# Patient Record
(demographics unavailable — no encounter records)

---

## 2025-04-02 NOTE — RISK ASSESSMENT
[0] : 2) Feeling down, depressed, or hopeless: Not at all (0) [PHQ-2 Negative - No further assessment needed] : PHQ-2 Negative - No further assessment needed [Yes] : Patient consents to screening. [Have you ever fainted, passed out or had an unexplained seizure suddenly and without warning, especially during exercise or in response] : Have you ever fainted, passed out or had an unexplained seizure suddenly and without warning, especially during exercise or in response to sudden loud noises such as doorbells, alarm clocks and ringing telephones? No [Have you ever had exercise-related chest pain or shortness of breath?] : Have you ever had exercise-related chest pain or shortness of breath? No [Has anyone in your immediate family (parents, grandparents, siblings) or other more distant relatives (aunts, uncles, cousins)  of heart] : Has anyone in your immediate family (parents, grandparents, siblings) or other more distant relatives (aunts, uncles, cousins)  of heart problems or had an unexpected sudden death before age 50 (This would include unexpected drownings, unexplained car accidents in which the relative was driving or sudden infant death syndrome.)? No [Are you related to anyone with hypertrophic cardiomyopathy or hypertrophic obstructive cardiomyopathy, Marfan syndrome, arrhythmogenic] : Are you related to anyone with hypertrophic cardiomyopathy or hypertrophic obstructive cardiomyopathy, Marfan syndrome, arrhythmogenic right ventricular cardiomyopathy, long QT syndrome, short QT syndrome, Brugada syndrome or catecholaminergic polymorphic ventricular tachycardia, or anyone younger than 50 years with a pacemaker or implantable defibrillator? No

## 2025-04-02 NOTE — REVIEW OF SYSTEMS
[Difficulty with Sleep] : difficulty with sleep [Snoring] : snoring [Negative] : Gastrointestinal [Fever] : no fever [Chills] : no chills [Malaise] : no malaise [Change in Weight] : no change in weight [Night Sweats] : no night sweats [Headache] : no headache [Eye Discharge] : no eye discharge [Eye Redness] : no eye redness [Itchy Eyes] : no itchy eyes [Changes in Vision] : no changes in vision [Ear Pain] : no ear pain [Nasal Discharge] : no nasal discharge [Nasal Congestion] : no nasal congestion [Sore Throat] : no sore throat

## 2025-04-02 NOTE — DISCUSSION/SUMMARY
[FreeTextEntry1] : Pranav is a 21 yo M with hx of obesity presenting for well visit. No concerns at this visit. Presented to clinic in 2023 due to some concerns about abnormal heart rhythm from [facility], which is a weight loss clinic; cardiac workup was performed by Dr. Eagle Victor in adult cardiology, which pt states was negative. Does not endorse any chest pain or palpitations at this visit. Pt states he continues to visit [facility] regularly and is administering GLP-1 injections under their supervision.   #obesity -BMI continues to be similar to previous visits - 36.6 -states that other people have told him that he snores; given that he is also reporting not feeling rested even after 8 hours of sleep, suspicion for MILIND - given ENT referral -labwork sent: a1c, lipid panel, CBC, CMP, HIV, Hep C -hx of high BP readings in 2022; BP today was 126/77  #hcm -passed vision and hearing screenings -received influenza vaccine; otherwise UTD on vaccinations -discussed eventual transition of care to family/internal medicine -can f/u in 1 year for 20 yo well visit, or sooner if concerns

## 2025-04-02 NOTE — PHYSICAL EXAM
[Alert] : alert [No Acute Distress] : no acute distress [Normocephalic] : normocephalic [EOMI Bilateral] : EOMI bilateral [Clear tympanic membranes with bony landmarks and light reflex present bilaterally] : clear tympanic membranes with bony landmarks and light reflex present bilaterally  [Pink Nasal Mucosa] : pink nasal mucosa [Nonerythematous Oropharynx] : nonerythematous oropharynx [No Palpable Masses] : no palpable masses [Regular Rate and Rhythm] : regular rate and rhythm [Normal S1, S2 audible] : normal S1, S2 audible [No Murmurs] : no murmurs [Soft] : soft [NonTender] : non tender [Non Distended] : non distended [Normoactive Bowel Sounds] : normoactive bowel sounds [No Abnormal Lymph Nodes Palpated] : no abnormal lymph nodes palpated [Normal Muscle Tone] : normal muscle tone [No pain or deformities with palpation of bone, muscles, joints] : no pain or deformities with palpation of bone, muscles, joints [Straight] : straight [Cranial Nerves Grossly Intact] : cranial nerves grossly intact [No Rash or Lesions] : no rash or lesions

## 2025-04-02 NOTE — HISTORY OF PRESENT ILLNESS
[Yes] : Patient goes to dentist yearly [Grade: ____] : Grade: [unfilled] [Normal Performance] : normal performance [Normal Behavior/Attention] : normal behavior/attention [Normal Homework] : normal homework [Drinks non-sweetened liquids] : drinks non-sweetened liquids  [No] : Patient has not had sexual intercourse [Up to date] : Up to date [Eats meals with family] : eats meals with family [Has family members/adults to turn to for help] : has family members/adults to turn to for help [Is permitted and is able to make independent decisions] : Is permitted and is able to make independent decisions [Sleep Concerns] : sleep concerns [Calcium source] : calcium source [Has friends] : has friends [Has ways to cope with stress] : has ways to cope with stress [Displays self-confidence] : displays self-confidence [Has problems with sleep] : has problems with sleep [Eats regular meals including adequate fruits and vegetables] : does not eat regular meals including adequate fruits and vegetables [Has concerns about body or appearance] : does not have concerns about body or appearance [Uses electronic nicotine delivery system] : does not use electronic nicotine delivery system [Exposure to electronic nicotine delivery system] : no exposure to electronic nicotine delivery system [Uses tobacco] : does not use tobacco [Exposure to tobacco] : no exposure to tobacco [Uses drugs] : does not use drugs  [Exposure to drugs] : no exposure to drugs [Drinks alcohol] : does not drink alcohol [Exposure to alcohol] : no exposure to alcohol [Gets depressed, anxious, or irritable/has mood swings] : does not get depressed, anxious, or irritable/has mood swings [Has thought about hurting self or considered suicide] : has not thought about hurting self or considered suicide [de-identified] : Requests influenza [de-identified] : Wakes up not feeling well rested, despite getting 8 hours of sleep [de-identified] : Often will only have 1-2 meals a day. Discussed having regular meals. [de-identified] : lots of walking during retail job